# Patient Record
Sex: FEMALE | Race: WHITE | NOT HISPANIC OR LATINO
[De-identification: names, ages, dates, MRNs, and addresses within clinical notes are randomized per-mention and may not be internally consistent; named-entity substitution may affect disease eponyms.]

---

## 2019-10-04 ENCOUNTER — APPOINTMENT (OUTPATIENT)
Dept: HEART AND VASCULAR | Facility: CLINIC | Age: 34
End: 2019-10-04
Payer: MEDICARE

## 2019-10-07 ENCOUNTER — NON-APPOINTMENT (OUTPATIENT)
Age: 34
End: 2019-10-07

## 2019-10-07 ENCOUNTER — APPOINTMENT (OUTPATIENT)
Dept: HEART AND VASCULAR | Facility: CLINIC | Age: 34
End: 2019-10-07
Payer: MEDICARE

## 2019-10-07 VITALS
HEIGHT: 70 IN | SYSTOLIC BLOOD PRESSURE: 150 MMHG | HEART RATE: 103 BPM | BODY MASS INDEX: 30.78 KG/M2 | WEIGHT: 215 LBS | DIASTOLIC BLOOD PRESSURE: 90 MMHG

## 2019-10-07 DIAGNOSIS — F41.1 GENERALIZED ANXIETY DISORDER: ICD-10-CM

## 2019-10-07 DIAGNOSIS — F17.210 NICOTINE DEPENDENCE, CIGARETTES, UNCOMPLICATED: ICD-10-CM

## 2019-10-07 PROCEDURE — 93000 ELECTROCARDIOGRAM COMPLETE: CPT

## 2019-10-07 PROCEDURE — 99214 OFFICE O/P EST MOD 30 MIN: CPT | Mod: 25

## 2019-10-07 PROCEDURE — 93306 TTE W/DOPPLER COMPLETE: CPT

## 2019-10-07 RX ORDER — ERGOCALCIFEROL 1.25 MG/1
1.25 MG CAPSULE, LIQUID FILLED ORAL
Qty: 4 | Refills: 0 | Status: ACTIVE | COMMUNITY
Start: 2018-11-26

## 2019-10-07 RX ORDER — SODIUM SULFACETAMIDE 100 MG/ML
10 LOTION TOPICAL
Qty: 118 | Refills: 0 | Status: ACTIVE | COMMUNITY
Start: 2019-05-02

## 2019-10-07 RX ORDER — DICYCLOMINE HYDROCHLORIDE 20 MG/1
20 TABLET ORAL
Qty: 60 | Refills: 0 | Status: ACTIVE | COMMUNITY
Start: 2019-05-02

## 2019-10-07 RX ORDER — PANCRELIPASE 36000; 180000; 114000 [USP'U]/1; [USP'U]/1; [USP'U]/1
36000-114000 CAPSULE, DELAYED RELEASE PELLETS ORAL
Qty: 180 | Refills: 0 | Status: ACTIVE | COMMUNITY
Start: 2019-05-28

## 2019-10-07 NOTE — HISTORY OF PRESENT ILLNESS
[FreeTextEntry1] : Patient is a 34-year-old white female with a long-standing history of anxiety disorder on multiple medications for stress and anxiety. She had been seen here approximately 2 years ago for palpitations sinus tachycardia and was tried on a beta blocker which she could not several tolerated as it was a worsening of some bronchospasm. She has been off any cardiac medications for at least a year she presents now with an episode occurring at night of abnormal breathing pattern associated with an ache in her left shoulder as well as a stinging burning feeling in her left arm which appears with self resolved. She denies any effort related symptoms of chest pain or shortness of breath she denies syncope near syncope. She has noted that has been a trend upward and her blood pressure which has been self monitor.

## 2019-10-07 NOTE — REVIEW OF SYSTEMS
[Headache] : headache [Dyspnea on exertion] : dyspnea during exertion [Palpitations] : palpitations [Negative] : Gastrointestinal

## 2019-10-07 NOTE — ASSESSMENT
[FreeTextEntry1] : Impression \par Progressive HTN and diffuse somatic complaints which appear non cardiac\par will add cardizem to lower BP? and HR \par Patient had 4 day telem in Aug 2019 will obtain resultsContine meds for anxiety  \par

## 2019-10-07 NOTE — PHYSICAL EXAM
[General Appearance - Well Developed] : well developed [Normal Appearance] : normal appearance [Well Groomed] : well groomed [General Appearance - Well Nourished] : well nourished [No Deformities] : no deformities [General Appearance - In No Acute Distress] : no acute distress [Normal Conjunctiva] : the conjunctiva exhibited no abnormalities [Eyelids - No Xanthelasma] : the eyelids demonstrated no xanthelasmas [Normal Oral Mucosa] : normal oral mucosa [No Oral Pallor] : no oral pallor [No Oral Cyanosis] : no oral cyanosis [Normal Jugular Venous A Waves Present] : normal jugular venous A waves present [Normal Jugular Venous V Waves Present] : normal jugular venous V waves present [No Jugular Venous Munguia A Waves] : no jugular venous munguia A waves [Respiration, Rhythm And Depth] : normal respiratory rhythm and effort [Exaggerated Use Of Accessory Muscles For Inspiration] : no accessory muscle use [Auscultation Breath Sounds / Voice Sounds] : lungs were clear to auscultation bilaterally [Normal] : normal [Rhythm Regular] : regular [Normal S1] : normal S1 [Normal S2] : normal S2 [II] : a grade 2 [Abdomen Soft] : soft [Abdomen Tenderness] : non-tender [Abdomen Mass (___ Cm)] : no abdominal mass palpated [Abnormal Walk] : normal gait [Gait - Sufficient For Exercise Testing] : the gait was sufficient for exercise testing [Nail Clubbing] : no clubbing of the fingernails [Cyanosis, Localized] : no localized cyanosis [Petechial Hemorrhages (___cm)] : no petechial hemorrhages [Skin Color & Pigmentation] : normal skin color and pigmentation [] : no rash [No Venous Stasis] : no venous stasis [Skin Lesions] : no skin lesions [No Skin Ulcers] : no skin ulcer [No Xanthoma] : no  xanthoma was observed [Oriented To Time, Place, And Person] : oriented to person, place, and time [Affect] : the affect was normal [Mood] : the mood was normal [No Anxiety] : not feeling anxious

## 2019-10-31 ENCOUNTER — APPOINTMENT (OUTPATIENT)
Dept: HEART AND VASCULAR | Facility: CLINIC | Age: 34
End: 2019-10-31

## 2019-11-06 ENCOUNTER — APPOINTMENT (OUTPATIENT)
Dept: HEART AND VASCULAR | Facility: CLINIC | Age: 34
End: 2019-11-06
Payer: MEDICARE

## 2019-11-06 VITALS
HEIGHT: 70 IN | DIASTOLIC BLOOD PRESSURE: 80 MMHG | BODY MASS INDEX: 31.35 KG/M2 | SYSTOLIC BLOOD PRESSURE: 140 MMHG | WEIGHT: 219 LBS

## 2019-11-06 PROCEDURE — 99214 OFFICE O/P EST MOD 30 MIN: CPT

## 2019-11-06 NOTE — PHYSICAL EXAM
[General Appearance - Well Developed] : well developed [Normal Appearance] : normal appearance [Well Groomed] : well groomed [General Appearance - Well Nourished] : well nourished [No Deformities] : no deformities [General Appearance - In No Acute Distress] : no acute distress [Normal Conjunctiva] : the conjunctiva exhibited no abnormalities [Eyelids - No Xanthelasma] : the eyelids demonstrated no xanthelasmas [Normal Oral Mucosa] : normal oral mucosa [No Oral Pallor] : no oral pallor [No Oral Cyanosis] : no oral cyanosis [Normal Jugular Venous A Waves Present] : normal jugular venous A waves present [Normal Jugular Venous V Waves Present] : normal jugular venous V waves present [No Jugular Venous Munguia A Waves] : no jugular venous munguia A waves [Respiration, Rhythm And Depth] : normal respiratory rhythm and effort [Exaggerated Use Of Accessory Muscles For Inspiration] : no accessory muscle use [Auscultation Breath Sounds / Voice Sounds] : lungs were clear to auscultation bilaterally [Heart Rate And Rhythm] : heart rate and rhythm were normal [Heart Sounds] : normal S1 and S2 [Murmurs] : no murmurs present [Abdomen Soft] : soft [Abdomen Tenderness] : non-tender [] : no hepato-splenomegaly [Abdomen Mass (___ Cm)] : no abdominal mass palpated [Abnormal Walk] : normal gait [Gait - Sufficient For Exercise Testing] : the gait was sufficient for exercise testing

## 2019-11-06 NOTE — HISTORY OF PRESENT ILLNESS
[FreeTextEntry1] : The patient was seen for tachycardia and hypertension and very often driven by panic disorder. Prior use of beta blocker was limited by asthma. Patient's been on low dose Cardizem home blood pressure readings have been lower with ranges of 1:30 to 145 and diastolics of 80-90. She has no significant symptoms of palpitations headaches chest pain or dyspnea

## 2020-08-03 ENCOUNTER — APPOINTMENT (OUTPATIENT)
Dept: HEART AND VASCULAR | Facility: CLINIC | Age: 35
End: 2020-08-03
Payer: COMMERCIAL

## 2020-08-03 ENCOUNTER — NON-APPOINTMENT (OUTPATIENT)
Age: 35
End: 2020-08-03

## 2020-08-03 VITALS
HEIGHT: 70 IN | SYSTOLIC BLOOD PRESSURE: 145 MMHG | WEIGHT: 226 LBS | HEART RATE: 97 BPM | BODY MASS INDEX: 32.35 KG/M2 | DIASTOLIC BLOOD PRESSURE: 80 MMHG

## 2020-08-03 PROCEDURE — 99214 OFFICE O/P EST MOD 30 MIN: CPT

## 2020-08-03 PROCEDURE — 93000 ELECTROCARDIOGRAM COMPLETE: CPT

## 2020-08-03 NOTE — HISTORY OF PRESENT ILLNESS
[FreeTextEntry1] : Seen here in 10/19 for tachycardia and BP was placed on Diltiazem\par Has periodic extra systoles with no susutained tachyarrhythmias or near syncope / syncope symptoms

## 2020-08-03 NOTE — PHYSICAL EXAM
[General Appearance - Well Developed] : well developed [Normal Appearance] : normal appearance [Well Groomed] : well groomed [General Appearance - Well Nourished] : well nourished [No Deformities] : no deformities [General Appearance - In No Acute Distress] : no acute distress [Normal Conjunctiva] : the conjunctiva exhibited no abnormalities [Eyelids - No Xanthelasma] : the eyelids demonstrated no xanthelasmas [Normal Jugular Venous A Waves Present] : normal jugular venous A waves present [Normal Jugular Venous V Waves Present] : normal jugular venous V waves present [Respiration, Rhythm And Depth] : normal respiratory rhythm and effort [No Jugular Venous Munguia A Waves] : no jugular venous munguia A waves [Tachycardia] : tachycardic [Exaggerated Use Of Accessory Muscles For Inspiration] : no accessory muscle use [Auscultation Breath Sounds / Voice Sounds] : lungs were clear to auscultation bilaterally [II] : a grade 2 [Abdomen Soft] : soft [Abdomen Tenderness] : non-tender [Abdomen Mass (___ Cm)] : no abdominal mass palpated [Gait - Sufficient For Exercise Testing] : the gait was sufficient for exercise testing [Abnormal Walk] : normal gait [Nail Clubbing] : no clubbing of the fingernails [Cyanosis, Localized] : no localized cyanosis [Petechial Hemorrhages (___cm)] : no petechial hemorrhages [] : no rash [Skin Color & Pigmentation] : normal skin color and pigmentation [Skin Lesions] : no skin lesions [No Venous Stasis] : no venous stasis [No Xanthoma] : no  xanthoma was observed [No Skin Ulcers] : no skin ulcer

## 2020-08-03 NOTE — ASSESSMENT
[FreeTextEntry1] : Assessment \par Symptoms suggestive of extrasystoles \par Holter offered pt declined \par Reasurance given \par F/i Echo for AI later in year \par trial of magnesium supplemenat if extrasystoles continue \par Can also consider beta blockers ( has depression history along w copd) )

## 2020-09-30 ENCOUNTER — NON-APPOINTMENT (OUTPATIENT)
Age: 35
End: 2020-09-30

## 2020-10-02 RX ORDER — DILTIAZEM HYDROCHLORIDE 120 MG/1
120 CAPSULE, EXTENDED RELEASE ORAL DAILY
Qty: 90 | Refills: 3 | Status: DISCONTINUED | COMMUNITY
Start: 2019-10-07 | End: 2020-10-02

## 2020-10-12 ENCOUNTER — NON-APPOINTMENT (OUTPATIENT)
Age: 35
End: 2020-10-12

## 2020-10-12 ENCOUNTER — APPOINTMENT (OUTPATIENT)
Dept: HEART AND VASCULAR | Facility: CLINIC | Age: 35
End: 2020-10-12
Payer: COMMERCIAL

## 2020-10-12 VITALS
SYSTOLIC BLOOD PRESSURE: 160 MMHG | BODY MASS INDEX: 32.64 KG/M2 | HEIGHT: 70 IN | WEIGHT: 228 LBS | DIASTOLIC BLOOD PRESSURE: 98 MMHG | HEART RATE: 123 BPM | RESPIRATION RATE: 14 BRPM

## 2020-10-12 DIAGNOSIS — J45.909 UNSPECIFIED ASTHMA, UNCOMPLICATED: ICD-10-CM

## 2020-10-12 DIAGNOSIS — Z00.00 ENCOUNTER FOR GENERAL ADULT MEDICAL EXAMINATION W/OUT ABNORMAL FINDINGS: ICD-10-CM

## 2020-10-12 PROCEDURE — 93306 TTE W/DOPPLER COMPLETE: CPT

## 2020-10-12 PROCEDURE — 93000 ELECTROCARDIOGRAM COMPLETE: CPT

## 2020-10-12 PROCEDURE — 99214 OFFICE O/P EST MOD 30 MIN: CPT

## 2020-10-13 PROBLEM — J45.909 ASTHMA: Status: ACTIVE | Noted: 2020-10-13

## 2020-10-13 NOTE — PHYSICAL EXAM
[General Appearance - Well Developed] : well developed [Normal Appearance] : normal appearance [Well Groomed] : well groomed [General Appearance - Well Nourished] : well nourished [No Deformities] : no deformities [General Appearance - In No Acute Distress] : no acute distress [Normal Conjunctiva] : the conjunctiva exhibited no abnormalities [Eyelids - No Xanthelasma] : the eyelids demonstrated no xanthelasmas [Normal Oral Mucosa] : normal oral mucosa [No Oral Pallor] : no oral pallor [No Oral Cyanosis] : no oral cyanosis [Normal Jugular Venous A Waves Present] : normal jugular venous A waves present [Normal Jugular Venous V Waves Present] : normal jugular venous V waves present [No Jugular Venous Munguia A Waves] : no jugular venous munguia A waves [Respiration, Rhythm And Depth] : normal respiratory rhythm and effort [Exaggerated Use Of Accessory Muscles For Inspiration] : no accessory muscle use [Auscultation Breath Sounds / Voice Sounds] : lungs were clear to auscultation bilaterally [Heart Rate And Rhythm] : heart rate and rhythm were normal [Heart Sounds] : normal S1 and S2 [Systolic grade ___/6] : A grade [unfilled]/6 systolic murmur was heard. [Diastolic Grade ___/4] : A grade [unfilled]/4 diastolic murmur was heard. [Abdomen Soft] : soft [Abdomen Tenderness] : non-tender [Abdomen Mass (___ Cm)] : no abdominal mass palpated [Abnormal Walk] : normal gait [Gait - Sufficient For Exercise Testing] : the gait was sufficient for exercise testing [Nail Clubbing] : no clubbing of the fingernails [Cyanosis, Localized] : no localized cyanosis [Petechial Hemorrhages (___cm)] : no petechial hemorrhages [Skin Color & Pigmentation] : normal skin color and pigmentation [] : no rash [No Venous Stasis] : no venous stasis [Skin Lesions] : no skin lesions [No Skin Ulcers] : no skin ulcer [No Xanthoma] : no  xanthoma was observed [Oriented To Time, Place, And Person] : oriented to person, place, and time [Affect] : the affect was normal [Mood] : the mood was normal [No Anxiety] : not feeling anxious

## 2020-10-13 NOTE — HISTORY OF PRESENT ILLNESS
[FreeTextEntry1] : Beginning 2 to 3 weeks ago, the Pt. c/o episodes of heart racing, "fluttering" , lasting variable amounts of time (5 to 20 min.), without a precipitant or maneuver to relieve the episode. These episodes occur both with and without exertion and are increasing in frequency over the last 1 to 2 weeks. The Pt. denies chest pain, BISWAS/SOB, orthopnea, lower extremity edema, dizziness, fatigue, diaphoresis. The patient considers this a change in their clinical condition.\par HTN/ h/o VHD- Echo ordered to assess LV function/possible progression of valvular heart disease.\par \par

## 2020-10-13 NOTE — REASON FOR VISIT
[Follow-Up - Clinic] : a clinic follow-up of [Hypertension] : hypertension [Palpitations] : palpitations [FreeTextEntry1] : GIACOMO (AI/MR)

## 2020-10-13 NOTE — DISCUSSION/SUMMARY
[Hypertension] : hypertension [Weight Loss] : weight loss [Sodium Restriction] : sodium restriction [Anxiety] : anxiety disorder NOS [Sinus Tachycardia] : sinus tachycardia [Patient] : the patient [de-identified] : Metoprolol ER 25mg QD [de-identified] : Metoprolol ER 25mg QD [FreeTextEntry1] : VHD (AI/MR)- Stable; no further intervention at this time (see echo).\par Palpitations most probably anxiety related. Pt. to see PMD and review blood work (including TFT's, CBC ect.). Will start low dose B-blocker for now and consider further work-up after review of bloods and trial of low dose Beta-blocker.

## 2020-10-14 ENCOUNTER — RX RENEWAL (OUTPATIENT)
Age: 35
End: 2020-10-14

## 2020-10-20 ENCOUNTER — APPOINTMENT (OUTPATIENT)
Dept: HEART AND VASCULAR | Facility: CLINIC | Age: 35
End: 2020-10-20
Payer: COMMERCIAL

## 2020-10-20 PROCEDURE — 99214 OFFICE O/P EST MOD 30 MIN: CPT | Mod: 95

## 2020-10-20 RX ORDER — DILTIAZEM HYDROCHLORIDE 120 MG/1
120 CAPSULE, EXTENDED RELEASE ORAL
Qty: 30 | Refills: 0 | Status: DISCONTINUED | COMMUNITY
Start: 2019-10-07

## 2020-10-20 NOTE — REVIEW OF SYSTEMS
[Palpitations] : palpitations [Dyspnea on exertion] : not dyspnea during exertion [Negative] : Gastrointestinal

## 2020-10-20 NOTE — PHYSICAL EXAM
[General Appearance - Well Developed] : well developed [Normal Appearance] : normal appearance [General Appearance - Well Nourished] : well nourished [Exaggerated Use Of Accessory Muscles For Inspiration] : no accessory muscle use [Respiration, Rhythm And Depth] : normal respiratory rhythm and effort [Skin Turgor] : normal skin turgor [] : no rash [Skin Color & Pigmentation] : normal skin color and pigmentation

## 2020-10-20 NOTE — HISTORY OF PRESENT ILLNESS
[FreeTextEntry1] : seen last week  week for resting tachycrdia \par usually triggered by stress and anxiety\par  Had been on cardizem which seems not to work \par had prior h/o asthma \par  [Home] : at home, [unfilled] , at the time of the visit. [Medical Office: ([facility])___] : at the medical office located in  [Verbal consent obtained from patient] : the patient, [unfilled]

## 2020-11-24 ENCOUNTER — APPOINTMENT (OUTPATIENT)
Dept: HEART AND VASCULAR | Facility: CLINIC | Age: 35
End: 2020-11-24

## 2021-02-19 ENCOUNTER — TRANSCRIPTION ENCOUNTER (OUTPATIENT)
Age: 36
End: 2021-02-19

## 2021-02-19 ENCOUNTER — NON-APPOINTMENT (OUTPATIENT)
Age: 36
End: 2021-02-19

## 2021-02-19 ENCOUNTER — APPOINTMENT (OUTPATIENT)
Dept: HEART AND VASCULAR | Facility: CLINIC | Age: 36
End: 2021-02-19
Payer: COMMERCIAL

## 2021-02-19 VITALS
DIASTOLIC BLOOD PRESSURE: 90 MMHG | HEIGHT: 70 IN | BODY MASS INDEX: 30.78 KG/M2 | HEART RATE: 132 BPM | WEIGHT: 215 LBS | SYSTOLIC BLOOD PRESSURE: 150 MMHG

## 2021-02-19 PROCEDURE — 99214 OFFICE O/P EST MOD 30 MIN: CPT

## 2021-02-19 PROCEDURE — 93000 ELECTROCARDIOGRAM COMPLETE: CPT

## 2021-02-19 PROCEDURE — 36415 COLL VENOUS BLD VENIPUNCTURE: CPT

## 2021-02-19 RX ORDER — MONTELUKAST 10 MG/1
10 TABLET, FILM COATED ORAL
Qty: 30 | Refills: 0 | Status: ACTIVE | COMMUNITY
Start: 2019-04-30

## 2021-02-19 RX ORDER — FOLIC ACID 1 MG/1
1 TABLET ORAL
Qty: 30 | Refills: 0 | Status: ACTIVE | COMMUNITY
Start: 2018-11-26

## 2021-02-19 RX ORDER — DILTIAZEM HYDROCHLORIDE 120 MG/1
120 CAPSULE, EXTENDED RELEASE ORAL DAILY
Qty: 90 | Refills: 1 | Status: DISCONTINUED | COMMUNITY
Start: 2020-10-16 | End: 2021-02-19

## 2021-02-19 RX ORDER — FLUTICASONE PROPIONATE 50 UG/1
50 SPRAY, METERED NASAL
Qty: 16 | Refills: 0 | Status: ACTIVE | COMMUNITY
Start: 2019-04-30

## 2021-02-19 RX ORDER — GABAPENTIN 300 MG/1
300 CAPSULE ORAL
Qty: 90 | Refills: 0 | Status: ACTIVE | COMMUNITY
Start: 2019-04-19

## 2021-02-19 NOTE — ASSESSMENT
[FreeTextEntry1] : Assessmnet \par Postprandial dyspnea \par with preserved LVFX will obtain BNP level to assess  LVor RV dysfx \par continue low dose beta blocker

## 2021-02-19 NOTE — PHYSICAL EXAM
[Normal Appearance] : normal appearance [General Appearance - Well Developed] : well developed [Well Groomed] : well groomed [General Appearance - Well Nourished] : well nourished [No Deformities] : no deformities [General Appearance - In No Acute Distress] : no acute distress [Normal Conjunctiva] : the conjunctiva exhibited no abnormalities [Eyelids - No Xanthelasma] : the eyelids demonstrated no xanthelasmas [Normal Oral Mucosa] : normal oral mucosa [No Oral Pallor] : no oral pallor [No Oral Cyanosis] : no oral cyanosis [Normal Jugular Venous A Waves Present] : normal jugular venous A waves present [Normal Jugular Venous V Waves Present] : normal jugular venous V waves present [No Jugular Venous Munguia A Waves] : no jugular venous munguia A waves [Respiration, Rhythm And Depth] : normal respiratory rhythm and effort [Auscultation Breath Sounds / Voice Sounds] : lungs were clear to auscultation bilaterally [Exaggerated Use Of Accessory Muscles For Inspiration] : no accessory muscle use [Tachycardia] : tachycardic [Normal S1] : normal S1 [Normal S2] : normal S2 [II] : a grade 2 [Abdomen Soft] : soft [Abdomen Tenderness] : non-tender [Abdomen Mass (___ Cm)] : no abdominal mass palpated [Abnormal Walk] : normal gait [Gait - Sufficient For Exercise Testing] : the gait was sufficient for exercise testing [Nail Clubbing] : no clubbing of the fingernails [Petechial Hemorrhages (___cm)] : no petechial hemorrhages [Cyanosis, Localized] : no localized cyanosis [] : no ischemic changes

## 2021-02-20 ENCOUNTER — NON-APPOINTMENT (OUTPATIENT)
Age: 36
End: 2021-02-20

## 2021-02-22 LAB
ANION GAP SERPL CALC-SCNC: 15 MMOL/L
BUN SERPL-MCNC: 6 MG/DL
CALCIUM SERPL-MCNC: 10.2 MG/DL
CHLORIDE SERPL-SCNC: 108 MMOL/L
CO2 SERPL-SCNC: 22 MMOL/L
CREAT SERPL-MCNC: 0.86 MG/DL
GLUCOSE SERPL-MCNC: 104 MG/DL
NT-PROBNP SERPL-MCNC: 35 PG/ML
POTASSIUM SERPL-SCNC: 4.5 MMOL/L
SODIUM SERPL-SCNC: 144 MMOL/L

## 2021-03-02 ENCOUNTER — APPOINTMENT (OUTPATIENT)
Dept: HEART AND VASCULAR | Facility: CLINIC | Age: 36
End: 2021-03-02
Payer: COMMERCIAL

## 2021-03-02 ENCOUNTER — NON-APPOINTMENT (OUTPATIENT)
Age: 36
End: 2021-03-02

## 2021-03-02 VITALS
WEIGHT: 212 LBS | HEIGHT: 70 IN | BODY MASS INDEX: 30.35 KG/M2 | DIASTOLIC BLOOD PRESSURE: 100 MMHG | SYSTOLIC BLOOD PRESSURE: 170 MMHG

## 2021-03-02 PROCEDURE — 93000 ELECTROCARDIOGRAM COMPLETE: CPT

## 2021-03-02 PROCEDURE — 99214 OFFICE O/P EST MOD 30 MIN: CPT

## 2021-03-02 PROCEDURE — 99072 ADDL SUPL MATRL&STAF TM PHE: CPT

## 2021-03-02 NOTE — PHYSICAL EXAM
[General Appearance - Well Developed] : well developed [Normal Appearance] : normal appearance [Well Groomed] : well groomed [General Appearance - Well Nourished] : well nourished [No Deformities] : no deformities [General Appearance - In No Acute Distress] : no acute distress [Normal Conjunctiva] : the conjunctiva exhibited no abnormalities [Eyelids - No Xanthelasma] : the eyelids demonstrated no xanthelasmas [Normal Oral Mucosa] : normal oral mucosa [No Oral Pallor] : no oral pallor [No Oral Cyanosis] : no oral cyanosis [Normal Jugular Venous A Waves Present] : normal jugular venous A waves present [Normal Jugular Venous V Waves Present] : normal jugular venous V waves present [No Jugular Venous Munguia A Waves] : no jugular venous munguia A waves [] : no respiratory distress [Respiration, Rhythm And Depth] : normal respiratory rhythm and effort [Exaggerated Use Of Accessory Muscles For Inspiration] : no accessory muscle use [Auscultation Breath Sounds / Voice Sounds] : lungs were clear to auscultation bilaterally [Heart Rate And Rhythm] : heart rate and rhythm were normal [Heart Sounds] : normal S1 and S2 [Murmurs] : no murmurs present

## 2021-03-02 NOTE — HISTORY OF PRESENT ILLNESS
[FreeTextEntry1] : episodes of profound tachycardia w sweating and HA \par Hr was 150 \par Jose M been on low dose metoprol  12.5 qd \par limited in the past by wheezing related to asthma\par

## 2021-03-02 NOTE — ASSESSMENT
[FreeTextEntry1] : Assessment \par Will; slowly titrate up beta blocker to 25mg \par Urine for pheo work up

## 2021-03-05 RX ORDER — DIAZEPAM 5 MG/1
5 TABLET ORAL
Refills: 0 | Status: ACTIVE | COMMUNITY
Start: 2019-04-19

## 2021-03-30 ENCOUNTER — APPOINTMENT (OUTPATIENT)
Dept: HEART AND VASCULAR | Facility: CLINIC | Age: 36
End: 2021-03-30
Payer: COMMERCIAL

## 2021-03-30 ENCOUNTER — NON-APPOINTMENT (OUTPATIENT)
Age: 36
End: 2021-03-30

## 2021-03-30 VITALS
HEIGHT: 70 IN | DIASTOLIC BLOOD PRESSURE: 85 MMHG | WEIGHT: 213 LBS | HEART RATE: 110 BPM | RESPIRATION RATE: 14 BRPM | SYSTOLIC BLOOD PRESSURE: 150 MMHG | BODY MASS INDEX: 30.49 KG/M2

## 2021-03-30 PROCEDURE — 93000 ELECTROCARDIOGRAM COMPLETE: CPT

## 2021-03-30 PROCEDURE — 99214 OFFICE O/P EST MOD 30 MIN: CPT

## 2021-03-30 PROCEDURE — 99072 ADDL SUPL MATRL&STAF TM PHE: CPT

## 2021-03-30 NOTE — PHYSICAL EXAM
[General Appearance - Well Developed] : well developed [Normal Appearance] : normal appearance [Well Groomed] : well groomed [General Appearance - Well Nourished] : well nourished [No Deformities] : no deformities [General Appearance - In No Acute Distress] : no acute distress [Normal Conjunctiva] : the conjunctiva exhibited no abnormalities [Eyelids - No Xanthelasma] : the eyelids demonstrated no xanthelasmas [Normal Oral Mucosa] : normal oral mucosa [No Oral Pallor] : no oral pallor [No Oral Cyanosis] : no oral cyanosis [Normal Jugular Venous A Waves Present] : normal jugular venous A waves present [Normal Jugular Venous V Waves Present] : normal jugular venous V waves present [No Jugular Venous Munguia A Waves] : no jugular venous munguia A waves [] : no respiratory distress [Respiration, Rhythm And Depth] : normal respiratory rhythm and effort [Auscultation Breath Sounds / Voice Sounds] : lungs were clear to auscultation bilaterally [Exaggerated Use Of Accessory Muscles For Inspiration] : no accessory muscle use [Heart Rate And Rhythm] : heart rate and rhythm were normal [Heart Sounds] : normal S1 and S2 [Murmurs] : no murmurs present

## 2021-04-01 ENCOUNTER — NON-APPOINTMENT (OUTPATIENT)
Age: 36
End: 2021-04-01

## 2021-04-01 NOTE — ASSESSMENT
[FreeTextEntry1] : Assessment \par Will; slowly titrate up beta blocker to 25mg BID\par consider endo eval for HTN ( elevated PTH)\par

## 2021-04-13 ENCOUNTER — APPOINTMENT (OUTPATIENT)
Dept: HEART AND VASCULAR | Facility: CLINIC | Age: 36
End: 2021-04-13

## 2021-04-20 ENCOUNTER — APPOINTMENT (OUTPATIENT)
Dept: HEART AND VASCULAR | Facility: CLINIC | Age: 36
End: 2021-04-20
Payer: COMMERCIAL

## 2021-04-20 ENCOUNTER — NON-APPOINTMENT (OUTPATIENT)
Age: 36
End: 2021-04-20

## 2021-04-20 VITALS
HEART RATE: 104 BPM | WEIGHT: 213 LBS | BODY MASS INDEX: 30.49 KG/M2 | HEIGHT: 70 IN | SYSTOLIC BLOOD PRESSURE: 150 MMHG | DIASTOLIC BLOOD PRESSURE: 80 MMHG

## 2021-04-20 PROCEDURE — 93000 ELECTROCARDIOGRAM COMPLETE: CPT

## 2021-04-20 PROCEDURE — 99214 OFFICE O/P EST MOD 30 MIN: CPT

## 2021-04-20 PROCEDURE — 99072 ADDL SUPL MATRL&STAF TM PHE: CPT

## 2021-04-20 NOTE — ASSESSMENT
[FreeTextEntry1] : Assessment \par Will; slowly titrate up beta blocker to 25mg BID\par consider endo eval for HTN ( elevated PTH)\par add Verapamil \par

## 2021-04-20 NOTE — HISTORY OF PRESENT ILLNESS
[FreeTextEntry1] : Recent 24 urine neg for Pheo \par Elevated PTH on bloods \par Still has heart racing with high bP spike \par Not clear what etiology of Tachycardia is \par Has had some relief w BB but hesitant to increase dose due to asthma hsitory\par

## 2021-05-19 ENCOUNTER — NON-APPOINTMENT (OUTPATIENT)
Age: 36
End: 2021-05-19

## 2021-05-19 ENCOUNTER — APPOINTMENT (OUTPATIENT)
Dept: HEART AND VASCULAR | Facility: CLINIC | Age: 36
End: 2021-05-19
Payer: COMMERCIAL

## 2021-05-19 VITALS
DIASTOLIC BLOOD PRESSURE: 80 MMHG | SYSTOLIC BLOOD PRESSURE: 130 MMHG | HEIGHT: 70 IN | HEART RATE: 91 BPM | WEIGHT: 220 LBS | BODY MASS INDEX: 31.5 KG/M2

## 2021-05-19 DIAGNOSIS — K22.4 DYSKINESIA OF ESOPHAGUS: ICD-10-CM

## 2021-05-19 DIAGNOSIS — R07.9 CHEST PAIN, UNSPECIFIED: ICD-10-CM

## 2021-05-19 PROCEDURE — 93000 ELECTROCARDIOGRAM COMPLETE: CPT

## 2021-05-19 PROCEDURE — 99214 OFFICE O/P EST MOD 30 MIN: CPT

## 2021-05-19 NOTE — ASSESSMENT
[FreeTextEntry1] : Trial of prn NTG for esophageal spasm \par resume use of PPI \par Manometery with Gi \par \par ETT MIBI orgderd

## 2021-05-19 NOTE — HISTORY OF PRESENT ILLNESS
[FreeTextEntry1] : Seen here many time for elevated HR and BP \par on CCB and BB \par Dosing of BB limited by asthma \par Has new onset of nocturanl chest tightness lasting 15 not assoc w diaphoresis \par Had been on PPI in past and was d/ubaldo

## 2021-05-27 ENCOUNTER — APPOINTMENT (OUTPATIENT)
Dept: HEART AND VASCULAR | Facility: CLINIC | Age: 36
End: 2021-05-27

## 2021-06-01 ENCOUNTER — APPOINTMENT (OUTPATIENT)
Dept: HEART AND VASCULAR | Facility: CLINIC | Age: 36
End: 2021-06-01

## 2021-06-22 ENCOUNTER — APPOINTMENT (OUTPATIENT)
Dept: HEART AND VASCULAR | Facility: CLINIC | Age: 36
End: 2021-06-22

## 2021-06-24 ENCOUNTER — APPOINTMENT (OUTPATIENT)
Dept: HEART AND VASCULAR | Facility: CLINIC | Age: 36
End: 2021-06-24
Payer: COMMERCIAL

## 2021-06-24 VITALS
BODY MASS INDEX: 31.5 KG/M2 | SYSTOLIC BLOOD PRESSURE: 135 MMHG | DIASTOLIC BLOOD PRESSURE: 80 MMHG | HEIGHT: 70 IN | HEART RATE: 90 BPM | WEIGHT: 220 LBS

## 2021-06-24 PROCEDURE — 99072 ADDL SUPL MATRL&STAF TM PHE: CPT

## 2021-06-24 PROCEDURE — 93015 CV STRESS TEST SUPVJ I&R: CPT

## 2021-07-07 ENCOUNTER — APPOINTMENT (OUTPATIENT)
Dept: HEART AND VASCULAR | Facility: CLINIC | Age: 36
End: 2021-07-07
Payer: COMMERCIAL

## 2021-07-07 ENCOUNTER — NON-APPOINTMENT (OUTPATIENT)
Age: 36
End: 2021-07-07

## 2021-07-07 VITALS
BODY MASS INDEX: 31.21 KG/M2 | SYSTOLIC BLOOD PRESSURE: 140 MMHG | HEIGHT: 70 IN | HEART RATE: 93 BPM | DIASTOLIC BLOOD PRESSURE: 80 MMHG | WEIGHT: 218 LBS

## 2021-07-07 PROCEDURE — 99214 OFFICE O/P EST MOD 30 MIN: CPT

## 2021-07-07 PROCEDURE — 99072 ADDL SUPL MATRL&STAF TM PHE: CPT

## 2021-07-07 PROCEDURE — 93000 ELECTROCARDIOGRAM COMPLETE: CPT

## 2021-07-07 RX ORDER — MAGNESIUM CHLORIDE 71.5 MG
71.5-119 TABLET, DELAYED RELEASE (ENTERIC COATED) ORAL
Qty: 180 | Refills: 3 | Status: ACTIVE | COMMUNITY
Start: 2021-04-20 | End: 1900-01-01

## 2021-07-07 NOTE — HISTORY OF PRESENT ILLNESS
[FreeTextEntry1] : Resting BP improved on BB and CCB \par has periods of accelerated BP an HR when lying down in bed\par no clear triggr identified \par Getting PT for Neck pain

## 2021-11-03 ENCOUNTER — APPOINTMENT (OUTPATIENT)
Dept: HEART AND VASCULAR | Facility: CLINIC | Age: 36
End: 2021-11-03

## 2021-11-29 ENCOUNTER — APPOINTMENT (OUTPATIENT)
Dept: HEART AND VASCULAR | Facility: CLINIC | Age: 36
End: 2021-11-29
Payer: COMMERCIAL

## 2022-01-05 ENCOUNTER — APPOINTMENT (OUTPATIENT)
Dept: HEART AND VASCULAR | Facility: CLINIC | Age: 37
End: 2022-01-05
Payer: COMMERCIAL

## 2022-01-05 ENCOUNTER — NON-APPOINTMENT (OUTPATIENT)
Age: 37
End: 2022-01-05

## 2022-01-05 VITALS
WEIGHT: 213 LBS | HEIGHT: 70 IN | HEART RATE: 84 BPM | RESPIRATION RATE: 14 BRPM | BODY MASS INDEX: 30.49 KG/M2 | SYSTOLIC BLOOD PRESSURE: 150 MMHG | DIASTOLIC BLOOD PRESSURE: 78 MMHG

## 2022-01-05 DIAGNOSIS — R00.2 PALPITATIONS: ICD-10-CM

## 2022-01-05 PROCEDURE — ZZZZZ: CPT

## 2022-01-05 PROCEDURE — 93306 TTE W/DOPPLER COMPLETE: CPT

## 2022-01-05 PROCEDURE — 99214 OFFICE O/P EST MOD 30 MIN: CPT

## 2022-01-05 PROCEDURE — 93000 ELECTROCARDIOGRAM COMPLETE: CPT

## 2022-01-05 RX ORDER — LEVALBUTEROL TARTRATE 45 UG/1
45 AEROSOL, METERED ORAL
Qty: 15 | Refills: 0 | Status: ACTIVE | COMMUNITY
Start: 2019-04-16

## 2022-01-05 NOTE — ASSESSMENT
[FreeTextEntry1] : can use BB on prnbasis \par esophogeal spasms resolved with d/c of domperidone \par medium level exercise is ok\par Echo Lvef 55% 2+AI \par Lv size nl

## 2022-04-07 ENCOUNTER — RX RENEWAL (OUTPATIENT)
Age: 37
End: 2022-04-07

## 2022-07-11 ENCOUNTER — RX RENEWAL (OUTPATIENT)
Age: 37
End: 2022-07-11

## 2022-10-03 ENCOUNTER — RX RENEWAL (OUTPATIENT)
Age: 37
End: 2022-10-03

## 2022-11-02 ENCOUNTER — RX RENEWAL (OUTPATIENT)
Age: 37
End: 2022-11-02

## 2022-12-21 NOTE — HISTORY OF PRESENT ILLNESS
Former smoker
[FreeTextEntry1] : Recent 24 urine neg for Pheo \par Elevated PTH on bloods \par Still has heart racing with high bP spike \par Not clear what etiology of Tachycardia is \par Has had some relief w BB but hesitant to increase dose due to asthma hsitory\par

## 2023-01-05 ENCOUNTER — RX RENEWAL (OUTPATIENT)
Age: 38
End: 2023-01-05

## 2023-01-31 ENCOUNTER — RX RENEWAL (OUTPATIENT)
Age: 38
End: 2023-01-31

## 2023-02-06 ENCOUNTER — APPOINTMENT (OUTPATIENT)
Dept: HEART AND VASCULAR | Facility: CLINIC | Age: 38
End: 2023-02-06

## 2023-03-06 ENCOUNTER — APPOINTMENT (OUTPATIENT)
Dept: HEART AND VASCULAR | Facility: CLINIC | Age: 38
End: 2023-03-06
Payer: COMMERCIAL

## 2023-03-06 ENCOUNTER — NON-APPOINTMENT (OUTPATIENT)
Age: 38
End: 2023-03-06

## 2023-03-06 VITALS
HEIGHT: 70 IN | DIASTOLIC BLOOD PRESSURE: 76 MMHG | SYSTOLIC BLOOD PRESSURE: 126 MMHG | WEIGHT: 234 LBS | BODY MASS INDEX: 33.5 KG/M2 | HEART RATE: 88 BPM

## 2023-03-06 DIAGNOSIS — F41.9 ANXIETY DISORDER, UNSPECIFIED: ICD-10-CM

## 2023-03-06 PROCEDURE — 93000 ELECTROCARDIOGRAM COMPLETE: CPT

## 2023-03-06 PROCEDURE — 93306 TTE W/DOPPLER COMPLETE: CPT

## 2023-03-06 PROCEDURE — 99214 OFFICE O/P EST MOD 30 MIN: CPT | Mod: 25

## 2023-03-06 NOTE — ASSESSMENT
[FreeTextEntry1] : Benign essential HTN (401.1) (I10)\par Aortic valve regurgitation, acquired (424.1) (I35.1)\par Heart palpitations (785.1) (R00.2)\par \par can use BB on prn basis \par esophageal spasms resolved with d/c of domperidone \par medium level exercise is ok\par Echo Lvef 55% 2+AI \par Lv size nl. \par

## 2023-03-06 NOTE — HISTORY OF PRESENT ILLNESS
[FreeTextEntry1] : 01/05/22\par Resting BP improved on BB and CCB \par has periods of accelerated BP an HR when lying down in bed\par no clear triggr identified \par Getting PT for Neck pain \par 03/06/23\par \par

## 2023-03-06 NOTE — ADDENDUM
[FreeTextEntry1] : I, Ronald Mandujano, assisted in documentation on 03/06/2023 acting as a scribe for Dr. Angel Cuadra.\par \par

## 2023-07-03 ENCOUNTER — RX RENEWAL (OUTPATIENT)
Age: 38
End: 2023-07-03

## 2023-07-05 ENCOUNTER — TRANSCRIPTION ENCOUNTER (OUTPATIENT)
Age: 38
End: 2023-07-05

## 2023-09-11 ENCOUNTER — APPOINTMENT (OUTPATIENT)
Dept: HEART AND VASCULAR | Facility: CLINIC | Age: 38
End: 2023-09-11

## 2023-09-18 ENCOUNTER — APPOINTMENT (OUTPATIENT)
Dept: HEART AND VASCULAR | Facility: CLINIC | Age: 38
End: 2023-09-18

## 2023-10-02 ENCOUNTER — NON-APPOINTMENT (OUTPATIENT)
Age: 38
End: 2023-10-02

## 2023-10-03 ENCOUNTER — NON-APPOINTMENT (OUTPATIENT)
Age: 38
End: 2023-10-03

## 2023-10-17 ENCOUNTER — APPOINTMENT (OUTPATIENT)
Dept: HEART AND VASCULAR | Facility: CLINIC | Age: 38
End: 2023-10-17

## 2023-11-28 ENCOUNTER — APPOINTMENT (OUTPATIENT)
Dept: HEART AND VASCULAR | Facility: CLINIC | Age: 38
End: 2023-11-28
Payer: MEDICARE

## 2023-11-28 ENCOUNTER — NON-APPOINTMENT (OUTPATIENT)
Age: 38
End: 2023-11-28

## 2023-11-28 VITALS
SYSTOLIC BLOOD PRESSURE: 130 MMHG | BODY MASS INDEX: 33.5 KG/M2 | HEIGHT: 70 IN | HEART RATE: 107 BPM | DIASTOLIC BLOOD PRESSURE: 80 MMHG | WEIGHT: 234 LBS

## 2023-11-28 DIAGNOSIS — I35.1 NONRHEUMATIC AORTIC (VALVE) INSUFFICIENCY: ICD-10-CM

## 2023-11-28 DIAGNOSIS — I10 ESSENTIAL (PRIMARY) HYPERTENSION: ICD-10-CM

## 2023-11-28 PROCEDURE — 99214 OFFICE O/P EST MOD 30 MIN: CPT | Mod: 25

## 2023-11-28 PROCEDURE — 93000 ELECTROCARDIOGRAM COMPLETE: CPT

## 2023-11-28 RX ORDER — NITROGLYCERIN 0.3 MG/1
0.3 TABLET SUBLINGUAL
Qty: 90 | Refills: 0 | Status: DISCONTINUED | COMMUNITY
Start: 2021-05-19 | End: 2023-11-28

## 2023-11-28 RX ORDER — FLUTICASONE FUROATE, UMECLIDINIUM BROMIDE AND VILANTEROL TRIFENATATE 100; 62.5; 25 UG/1; UG/1; UG/1
100-62.5-25 POWDER RESPIRATORY (INHALATION) AS DIRECTED
Qty: 5 | Refills: 0 | Status: ACTIVE | COMMUNITY
Start: 2023-11-28

## 2023-11-28 RX ORDER — QUETIAPINE FUMARATE 25 MG/1
25 TABLET ORAL
Qty: 30 | Refills: 0 | Status: DISCONTINUED | COMMUNITY
Start: 2019-04-19 | End: 2023-11-28

## 2023-11-28 RX ORDER — DULOXETINE HYDROCHLORIDE 60 MG/1
60 CAPSULE, DELAYED RELEASE PELLETS ORAL
Qty: 30 | Refills: 0 | Status: ACTIVE | COMMUNITY
Start: 2019-04-19

## 2023-11-28 RX ORDER — FLUTICASONE FUROATE 200 UG/1
200 POWDER RESPIRATORY (INHALATION) DAILY
Qty: 1 | Refills: 0 | Status: DISCONTINUED | COMMUNITY
Start: 2019-04-30 | End: 2023-11-28

## 2023-11-28 RX ORDER — AMITRIPTYLINE HYDROCHLORIDE 50 MG/1
50 TABLET, FILM COATED ORAL
Qty: 30 | Refills: 3 | Status: DISCONTINUED | COMMUNITY
Start: 2019-05-02 | End: 2023-11-28

## 2023-11-28 RX ORDER — VERAPAMIL HYDROCHLORIDE 120 MG/1
120 CAPSULE, EXTENDED RELEASE ORAL
Qty: 90 | Refills: 3 | Status: ACTIVE | COMMUNITY
Start: 2021-04-20

## 2023-11-28 RX ORDER — MEDROXYPROGESTERONE ACETATE 150 MG/ML
150 INJECTION, SUSPENSION INTRAMUSCULAR
Qty: 1 | Refills: 0 | Status: DISCONTINUED | COMMUNITY
Start: 2019-05-07 | End: 2023-11-28

## 2023-11-28 RX ORDER — DULOXETINE HYDROCHLORIDE 30 MG/1
30 CAPSULE, DELAYED RELEASE PELLETS ORAL
Qty: 60 | Refills: 0 | Status: DISCONTINUED | COMMUNITY
Start: 2019-04-19 | End: 2023-11-28

## 2024-01-02 ENCOUNTER — RX RENEWAL (OUTPATIENT)
Age: 39
End: 2024-01-02

## 2024-04-01 ENCOUNTER — RX RENEWAL (OUTPATIENT)
Age: 39
End: 2024-04-01

## 2024-06-28 ENCOUNTER — RX RENEWAL (OUTPATIENT)
Age: 39
End: 2024-06-28

## 2024-07-03 ENCOUNTER — APPOINTMENT (OUTPATIENT)
Dept: HEART AND VASCULAR | Facility: CLINIC | Age: 39
End: 2024-07-03

## 2024-09-25 ENCOUNTER — RX RENEWAL (OUTPATIENT)
Age: 39
End: 2024-09-25

## 2024-10-14 ENCOUNTER — APPOINTMENT (OUTPATIENT)
Dept: HEART AND VASCULAR | Facility: CLINIC | Age: 39
End: 2024-10-14

## 2024-11-14 ENCOUNTER — APPOINTMENT (OUTPATIENT)
Dept: HEART AND VASCULAR | Facility: CLINIC | Age: 39
End: 2024-11-14

## 2024-12-26 ENCOUNTER — RX RENEWAL (OUTPATIENT)
Age: 39
End: 2024-12-26

## 2025-01-08 NOTE — ASSESSMENT
[FreeTextEntry1] : Assessment\par Ideally beta blocker better agent for ST due to anxiety will limit use to beta 1 selective agent and start at 12.5 qd and monitor fore reactive arway disease 
SN, Pt

## 2025-03-24 ENCOUNTER — APPOINTMENT (OUTPATIENT)
Dept: HEART AND VASCULAR | Facility: CLINIC | Age: 40
End: 2025-03-24
Payer: MEDICARE

## 2025-03-24 ENCOUNTER — APPOINTMENT (OUTPATIENT)
Dept: HEART AND VASCULAR | Facility: CLINIC | Age: 40
End: 2025-03-24

## 2025-03-24 ENCOUNTER — NON-APPOINTMENT (OUTPATIENT)
Age: 40
End: 2025-03-24

## 2025-03-24 VITALS
BODY MASS INDEX: 32.07 KG/M2 | WEIGHT: 224 LBS | HEIGHT: 70 IN | SYSTOLIC BLOOD PRESSURE: 180 MMHG | RESPIRATION RATE: 14 BRPM | DIASTOLIC BLOOD PRESSURE: 90 MMHG | HEART RATE: 88 BPM

## 2025-03-24 DIAGNOSIS — I10 ESSENTIAL (PRIMARY) HYPERTENSION: ICD-10-CM

## 2025-03-24 PROCEDURE — 93000 ELECTROCARDIOGRAM COMPLETE: CPT

## 2025-03-24 PROCEDURE — ZZZZZ: CPT

## 2025-03-24 PROCEDURE — 99214 OFFICE O/P EST MOD 30 MIN: CPT | Mod: 25

## 2025-03-24 PROCEDURE — 99406 BEHAV CHNG SMOKING 3-10 MIN: CPT

## 2025-03-24 PROCEDURE — 93306 TTE W/DOPPLER COMPLETE: CPT

## 2025-03-24 PROCEDURE — G2211 COMPLEX E/M VISIT ADD ON: CPT

## 2025-08-29 ENCOUNTER — RX RENEWAL (OUTPATIENT)
Age: 40
End: 2025-08-29